# Patient Record
Sex: MALE | Race: WHITE | Employment: FULL TIME | URBAN - METROPOLITAN AREA
[De-identification: names, ages, dates, MRNs, and addresses within clinical notes are randomized per-mention and may not be internally consistent; named-entity substitution may affect disease eponyms.]

---

## 2020-05-04 ENCOUNTER — VIRTUAL VISIT (OUTPATIENT)
Dept: PHYSICAL THERAPY | Facility: CLINIC | Age: 55
End: 2020-05-04
Payer: OTHER MISCELLANEOUS

## 2020-05-04 DIAGNOSIS — S86.811D STRAIN OF CALF MUSCLE, RIGHT, SUBSEQUENT ENCOUNTER: ICD-10-CM

## 2020-05-04 PROCEDURE — 97161 PT EVAL LOW COMPLEX 20 MIN: CPT | Mod: 95 | Performed by: PHYSICAL THERAPIST

## 2020-05-04 PROCEDURE — 97110 THERAPEUTIC EXERCISES: CPT | Mod: 95 | Performed by: PHYSICAL THERAPIST

## 2020-05-04 NOTE — LETTER
"[facility]  [address]  [address]  [place] [address]  [phone]    May 4, 2020    Re: Gonzalo Murry   :   1965  MRN:  [id number]   REFERRING PHYSICIAN:   ANDREA MUHAMMAD RICHY    Date of Initial Evaluation: 20  Visits:  Rxs Used: 1  Reason for Referral:  Strain of calf muscle, right, subsequent encounter    EVALUATION SUMMARY    Physical Therapy Virtual Initial Visit      The patient has been notified of following:     \"This virtual visit will be conducted between you and your provider. We have found that certain health care needs can be provided without the need for physical presence.  This service lets us provide the care you need with a virtual visit.\"    Due to external, as well as internal [facility] management of the COVID-19 Virus, Gonzalo Murry was not seen in our clinic.  As a substitution, we implemented a virtual visit to manage this patient's condition utilizing the PTRx virtual visit platform via the patient s existing code.  The provider, Talib Michel, reviewed the patient's chart, PTRx prescription, and spoke with the patient to determine the following telemedicine visit is appropriate and effective for the patient's care.    The following type of visit was completed:   Telephone Visit:  A telephone visit was used in conjunction with the online PTRx exercise platform.         Subjective:  CESAR Murry is a 54 year old male with a right ankle/calf condition.    The condition occurred due to pushing a heavy object in standing position.  The condition occurred at work.  This is a new condition.  Mechanism/History of injury/symptoms:  20 patient was helping to push a scissor-lift up an incline and felt a pop in his right calf with immediate pain.  He was diagnosed with a calf strain and has been off work since then.   He notes it is improving each day.  The pain is located right posterior calf to heel and the quality of pain is " reported as aching.  The degree of pain is reported as 2-5/10. The timing of pain/symptoms is stiffer in AM. Associated symptoms include: loss of motion/stiffness, weakness, bruising  Gonzalo Lovely   :   1965        Symptoms are exacerbated by: going up/down stairs or ladder, long walks.  Symptoms are relieved by: wrapping the calf, ice/elevation.  Since onset symptoms are gradually improving.    Special tests for this condition include: none.  Previous treatments for this condition include: none.    General health as reported by patient is good.  Pertinent medical history includes: none indicated.  Medical allergies include: none indicated.  Other surgeries include: none indicated.  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  Patient is currently not working due to present treatment condition.    Potential barriers to physical therapy: none.  Red flags: none.    Previous level of function: unrestricted walking/standing as well as stairs and ladderss  Current functional restrictions:  Limited walking/standing tolerance, has not attempted ladders, stairs uncomfortable                Objective:  ANKLE:     AROM L AROM R   Dorsiflexion Normal per patient report Limited per patient report   Plantarflexion symmetrical per patient report symmetrical per patient report   Inversion symmetrical per patient report symmetrical per patient report   Eversion symmetrical per patient report symmetrical per patient report     Edema:    General: none per patient report      Palpation: patient reports mild pain to palpation in right mid-substance of calf with palpable knot, some bruising distally    Gait: patient verbalizes he no longer limps when walking around home  Gonzalo Lovely   :   1965            System  Physical Exam  General   ROS    PTRx Content from today's  visit:  Exercise Name: Towel Stretch Gastroc, Sets: 1 - Reps: 3 with 30 second hold each rep - Sessions: 2-3  Exercise Name: Soleus Stretch on Chair, Sets: 1 - Reps: 3 with 30 second hold - Sessions: 2-3  Exercise Name: Seated Toe Raises/Heel Raises, Sets: 1-2 - Reps: up to 30 - Sessions: 1-2, Notes: you can add a light weight on your knee when this is easy  Exercise Name: Toe Raises instructed to start in 5-7 days if no pain with seated calf raises, Sets: 1-2 - Reps: 10-15 initially, goal is to build up to 30 - Sessions: 1-2, Notes: Start this when you can do sitting calf raises without any pain at all.  progression from flat floor to doing this off a step would be okay in 1-2 weeks  Exercise Name: Balance Single Leg Stance Supported and Unsupported, Sets: 3 - Reps: 30 second hold - Sessions: 1-2, Notes: start on flat floor, when this is easy stand on a pillow or folded towel    Assessment/Plan:     Patient is a 54 year old male with right side ankle complaints.    Patient has the following significant findings with corresponding treatment plan.                Diagnosis 1:  Calf strain    Pain -  hot/cold therapy, manual therapy, self management, education and home program  Decreased ROM/flexibility - manual therapy, therapeutic exercise and home program  Decreased strength - therapeutic exercise, therapeutic activities and home program  Decreased proprioception - neuro re-education, therapeutic activities and home program  Decreased function - therapeutic activities and home program    Previous and current functional limitations:  (See Goal Flow Sheet for this information)    Short term and Long term goals: (See Goal Flow Sheet for this information)     Communication ability:  Patient appears to be able to clearly communicate and understand verbal and written communication and follow directions correctly.  Treatment Explanation - The following has been discussed with the patient:   RX ordered/plan of  care  Anticipated outcomes  Possible risks and side effects  This patient would benefit from PT intervention to resume normal activities.   Rehab potential is good.    Frequency:  1 X week, once daily  Duration:  for 6 weeks  Discharge Plan:  Achieve all LTG.  Gonzalo Murry   :   1965        Independent in home treatment program.  Reach maximal therapeutic benefit.    Please refer to the daily flowsheet for treatment today, total treatment time and time spent performing 1:1 timed codes.       Virtual visit contact time    Time of service began: 9:20 AM  Time of service ended: 10:00 AM  Total Time for set up, visit, and documentation: 60 minutes (10 minutes spent attempting to get video connection)    No coverage found.      Procedure Code/s   Therapeutic Exercise (34398): 20 minutes  Evaluation: 20 minutes    I have reviewed the note as documented above.  This accurately captures the substance of my conversation with the patient.  Provider location: DIONICIO Reynoso ([facility]/State)  Patient location: home    ___________________________________________________             Thank you for your referral.    INQUIRIES  Therapist: ALEJANDRA Lepe [facility]  [address]  [address]  [place] [address]  Phone: [phone]  Fax: [phone]

## 2020-05-04 NOTE — Clinical Note
Abdon,  I scheduled a phone follow up for this walker with you on Monday 5/11.  Good walker, coming off a calf strain and is progressing well.  We could not get video going on his tablet or phone.  He has an old latptop he may use to attempt a video visit with you if you have time, but otherwise is okay doing the phone visits.  He should come along pretty well.  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 Let me know if you have any questions or anything.  Thanks.    Abdullahi

## 2020-05-04 NOTE — PROGRESS NOTES
"Physical Therapy Virtual Initial Visit      The patient has been notified of following:     \"This virtual visit will be conducted between you and your provider. We have found that certain health care needs can be provided without the need for physical presence.  This service lets us provide the care you need with a virtual visit.\"    Due to external, as well as internal [facility] management of the COVID-19 Virus, Gonzalo Murry was not seen in our clinic.  As a substitution, we implemented a virtual visit to manage this patient's condition utilizing the PTRx virtual visit platform via the patient s existing code.  The provider, Talib Michel, reviewed the patient's chart, PTRx prescription, and spoke with the patient to determine the following telemedicine visit is appropriate and effective for the patient's care.    The following type of visit was completed:   Telephone Visit:  A telephone visit was used in conjunction with the online PTRx exercise platform.         Subjective:  HPI   Gonzalo Murry is a 54 year old male with a right ankle/calf condition.    The condition occurred due to pushing a heavy object in standing position.  The condition occurred at work.  This is a new condition.    Mechanism/History of injury/symptoms:  4/28/20 patient was helping to push a scissor-lift up an incline and felt a pop in his right calf with immediate pain.  He was diagnosed with a calf strain and has been off work since then.   He notes it is improving each day.  The pain is located right posterior calf to heel and the quality of pain is reported as aching.  The degree of pain is reported as 2-5/10. The timing of pain/symptoms is stiffer in AM. Associated symptoms include: loss of motion/stiffness, weakness, bruising    Symptoms are exacerbated by: going up/down stairs or ladder, long walks.  Symptoms are relieved by: wrapping the calf, ice/elevation.  Since onset symptoms are gradually improving.    Special tests for " this condition include: none.  Previous treatments for this condition include: none.    General health as reported by patient is good.  Pertinent medical history includes: none indicated.  Medical allergies include: none indicated.  Other surgeries include: none indicated.  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  Patient is currently not working due to present treatment condition.    Potential barriers to physical therapy: none.  Red flags: none.    Previous level of function: unrestricted walking/standing as well as stairs and ladderss  Current functional restrictions:  Limited walking/standing tolerance, has not attempted ladders, stairs uncomfortable                Objective:  ANKLE:     AROM L AROM R   Dorsiflexion Normal per patient report Limited per patient report   Plantarflexion symmetrical per patient report symmetrical per patient report   Inversion symmetrical per patient report symmetrical per patient report   Eversion symmetrical per patient report symmetrical per patient report     Edema:    General: none per patient report      Palpation: patient reports mild pain to palpation in right mid-substance of calf with palpable knot, some bruising distally    Gait: patient verbalizes he no longer limps when walking around home            System  Physical Exam  General   ROS    PTRx Content from today's visit:  Exercise Name: Towel Stretch Gastroc, Sets: 1 - Reps: 3 with 30 second hold each rep - Sessions: 2-3  Exercise Name: Soleus Stretch on Chair, Sets: 1 - Reps: 3 with 30 second hold - Sessions: 2-3  Exercise Name: Seated Toe Raises/Heel Raises, Sets: 1-2 - Reps: up to 30 - Sessions: 1-2, Notes: you can add a light weight on your knee when this is easy  Exercise Name: Toe Raises instructed to start in 5-7 days if no pain with seated calf raises, Sets: 1-2 -  Reps: 10-15 initially, goal is to build up to 30 - Sessions: 1-2, Notes: Start this when you can do sitting calf raises without any pain at all.  progression from flat floor to doing this off a step would be okay in 1-2 weeks  Exercise Name: Balance Single Leg Stance Supported and Unsupported, Sets: 3 - Reps: 30 second hold - Sessions: 1-2, Notes: start on flat floor, when this is easy stand on a pillow or folded towel    Assessment/Plan:     Patient is a 54 year old male with right side ankle complaints.    Patient has the following significant findings with corresponding treatment plan.                Diagnosis 1:  Calf strain    Pain -  hot/cold therapy, manual therapy, self management, education and home program  Decreased ROM/flexibility - manual therapy, therapeutic exercise and home program  Decreased strength - therapeutic exercise, therapeutic activities and home program  Decreased proprioception - neuro re-education, therapeutic activities and home program  Decreased function - therapeutic activities and home program    Therapy Evaluation Codes:   1) History comprised of:   Personal factors that impact the plan of care:      None.    Comorbidity factors that impact the plan of care are:      None.     Medications impacting care: None.  2) Examination of Body Systems comprised of:   Body structures and functions that impact the plan of care:      Ankle.   Activity limitations that impact the plan of care are:      Jumping, Lifting, Running, Stairs, Standing, Walking and Working.  3) Clinical presentation characteristics are:   Stable/Uncomplicated.  4) Decision-Making    Low complexity using standardized patient assessment instrument and/or measureable assessment of functional outcome.  Cumulative Therapy Evaluation is: Low complexity.    Previous and current functional limitations:  (See Goal Flow Sheet for this information)    Short term and Long term goals: (See Goal Flow Sheet for this information)      Communication ability:  Patient appears to be able to clearly communicate and understand verbal and written communication and follow directions correctly.  Treatment Explanation - The following has been discussed with the patient:   RX ordered/plan of care  Anticipated outcomes  Possible risks and side effects  This patient would benefit from PT intervention to resume normal activities.   Rehab potential is good.    Frequency:  1 X week, once daily  Duration:  for 6 weeks  Discharge Plan:  Achieve all LTG.  Independent in home treatment program.  Reach maximal therapeutic benefit.    Please refer to the daily flowsheet for treatment today, total treatment time and time spent performing 1:1 timed codes.       Virtual visit contact time    Time of service began: 9:20 AM  Time of service ended: 10:00 AM  Total Time for set up, visit, and documentation: 60 minutes (10 minutes spent attempting to get video connection)    No coverage found.      Procedure Code/s   Therapeutic Exercise (37281): 20 minutes  Evaluation: 20 minutes    I have reviewed the note as documented above.  This accurately captures the substance of my conversation with the patient.  Provider location: DIONICIO Reynoso ([facility]/State)  Patient location: home    ___________________________________________________

## 2020-05-11 ENCOUNTER — VIRTUAL VISIT (OUTPATIENT)
Dept: PHYSICAL THERAPY | Facility: CLINIC | Age: 55
End: 2020-05-11
Payer: OTHER MISCELLANEOUS

## 2020-05-11 DIAGNOSIS — S86.811D STRAIN OF CALF MUSCLE, RIGHT, SUBSEQUENT ENCOUNTER: Primary | ICD-10-CM

## 2020-05-11 PROCEDURE — 97110 THERAPEUTIC EXERCISES: CPT | Mod: 95 | Performed by: PHYSICAL THERAPIST

## 2020-05-11 NOTE — PROGRESS NOTES
"Physical Therapy Virtual Follow Up Visit      The patient has been notified of following:     \"This virtual visit will be conducted between you and your provider. We have found that certain health care needs can be provided without the need for physical presence.  This service lets us provide the care you need with a virtual visit.\"    Due to external, as well as internal [facility] management of the COVID-19 Virus, Gonzalo Murry was not seen in our clinic.  As a substitution, we implemented a virtual visit to manage this patient's condition utilizing the PTRx virtual visit platform via the patient s existing code.  The provider, Abdon Fabian, reviewed the patient's chart, PTRx prescription, and spoke with the patient to determine the following telemedicine visit is appropriate and effective for the patient's care.    The following type of visit was completed:   Telephone Visit:  A telephone visit was used in conjunction with the online PTRx exercise platform.        S: Gonzalo Murry is a 54 year old male. Connected virtually on the PTRx platform to discuss their condition/progress. He reports he has not had any pain over the past two days and feels good in regards to R calf.  Current pain level: 0/10    O: Patient demonstrated, reported normal range, able to perform stairs painfree ascent and descent, able to perform standing exercises and toe raises off a step painfree.     PTRx Content from today's visit:  Exercise Name: Standing Gastroc Stretch, Notes: hold 30sec holds x2 once daily  Exercise Name: Soleus Stretch on Chair, Sets: 1 - Reps: 3 with 30 second hold - Sessions: 2-3  Exercise Name: Toe Raises, Sets: 1-2 - Reps: 10-15 initially, goal is to build up to 30 - Sessions: 1-2, Notes: Start this when you can do sitting calf raises without any pain at all.  progression from flat floor to doing this off a step would be okay in 1-2 weeks  Exercise Name: Balance Single Leg Stance Supported and " Unsupported, Sets: 3 - Reps: 30 second hold - Sessions: 1-2, Notes: start on flat floor, when this is easy stand on a pillow or folded towel    A:   Patient's symptoms are resolving.  Patient is progressing as expected.  Response to therapy has shown an improvement in  pain level and strength      P: Patient will continue with the exercise program assigned on their PTRx code and will add the following measures to manage their pain/condition: added a weight bearing gastroc stretch and removed towel stretch and seated toe raises.    Plan was for him to simulate work activities by attempting ladder negotiation as follows: ladder once, ladder repeatedly, ladder once with tool belt with drill and hammer, ladder repeatedly with tool belt holding drill and hammer, ladder once holding object with tool belt with drill and hammer, if he is able to achieve this work simulation without calf pain or tightness then no further concerns about returning to work.    Current treatment program is being advanced to more complex exercises.      Virtual visit contact time    Time of service began: 9:45 AM  Time of service ended: 10:01 AM  Total Time for set up, visit, and documentation: 10 minutes    [employer]: WORK COMP / Plan:  [employer] / Product Type: *No Product type* /   .  Procedure Code/s   Therapeutic Exercise (84144): 14 minutes    I have reviewed the note as documented above.  This accurately captures the substance of my conversation with the patient.  Provider location: [place] ([facility])  Patient location: [facility]

## 2022-03-02 ENCOUNTER — APPOINTMENT (OUTPATIENT)
Dept: RADIOLOGY | Facility: CLINIC | Age: 57
End: 2022-03-02
Attending: STUDENT IN AN ORGANIZED HEALTH CARE EDUCATION/TRAINING PROGRAM
Payer: COMMERCIAL

## 2022-03-02 ENCOUNTER — HOSPITAL ENCOUNTER (EMERGENCY)
Facility: CLINIC | Age: 57
Discharge: HOME | End: 2022-03-02
Attending: EMERGENCY MEDICINE | Admitting: EMERGENCY MEDICINE
Payer: COMMERCIAL

## 2022-03-02 VITALS
OXYGEN SATURATION: 97 % | TEMPERATURE: 98.6 F | BODY MASS INDEX: 28.17 KG/M2 | HEART RATE: 67 BPM | HEIGHT: 64 IN | SYSTOLIC BLOOD PRESSURE: 128 MMHG | WEIGHT: 165 LBS | RESPIRATION RATE: 15 BRPM | DIASTOLIC BLOOD PRESSURE: 76 MMHG

## 2022-03-02 DIAGNOSIS — R42 LIGHTHEADEDNESS: ICD-10-CM

## 2022-03-02 LAB
ALBUMIN SERPL-MCNC: 4 G/DL (ref 3.5–5)
ALBUMIN UR-MCNC: NEGATIVE MG/DL
ALP SERPL-CCNC: 66 U/L (ref 45–120)
ALT SERPL W P-5'-P-CCNC: 21 U/L (ref 0–45)
ANION GAP SERPL CALCULATED.3IONS-SCNC: 10 MMOL/L (ref 5–18)
APPEARANCE UR: CLEAR
AST SERPL W P-5'-P-CCNC: 19 U/L (ref 0–40)
BASOPHILS # BLD AUTO: 0 10E3/UL (ref 0–0.2)
BASOPHILS NFR BLD AUTO: 1 %
BILIRUB SERPL-MCNC: 0.5 MG/DL (ref 0–1)
BILIRUB UR QL STRIP: NEGATIVE
BUN SERPL-MCNC: 13 MG/DL (ref 8–22)
CALCIUM SERPL-MCNC: 8.7 MG/DL (ref 8.5–10.5)
CHLORIDE BLD-SCNC: 109 MMOL/L (ref 98–107)
CO2 SERPL-SCNC: 21 MMOL/L (ref 22–31)
COLOR UR AUTO: ABNORMAL
CREAT SERPL-MCNC: 0.8 MG/DL (ref 0.7–1.3)
EOSINOPHIL # BLD AUTO: 0.3 10E3/UL (ref 0–0.7)
EOSINOPHIL NFR BLD AUTO: 8 %
ERYTHROCYTE [DISTWIDTH] IN BLOOD BY AUTOMATED COUNT: 12.5 % (ref 10–15)
ETHANOL SERPL-MCNC: 16 MG/DL
GFR SERPL CREATININE-BSD FRML MDRD: >90 ML/MIN/1.73M2
GLUCOSE BLD-MCNC: 94 MG/DL (ref 70–125)
GLUCOSE UR STRIP-MCNC: NEGATIVE MG/DL
HCT VFR BLD AUTO: 42.9 % (ref 40–53)
HGB BLD-MCNC: 14.2 G/DL (ref 13.3–17.7)
HGB UR QL STRIP: NEGATIVE
IMM GRANULOCYTES # BLD: 0 10E3/UL
IMM GRANULOCYTES NFR BLD: 0 %
KETONES UR STRIP-MCNC: NEGATIVE MG/DL
LEUKOCYTE ESTERASE UR QL STRIP: NEGATIVE
LYMPHOCYTES # BLD AUTO: 0.7 10E3/UL (ref 0.8–5.3)
LYMPHOCYTES NFR BLD AUTO: 20 %
MAGNESIUM SERPL-MCNC: 1.8 MG/DL (ref 1.8–2.6)
MCH RBC QN AUTO: 31.1 PG (ref 26.5–33)
MCHC RBC AUTO-ENTMCNC: 33.1 G/DL (ref 31.5–36.5)
MCV RBC AUTO: 94 FL (ref 78–100)
MONOCYTES # BLD AUTO: 0.3 10E3/UL (ref 0–1.3)
MONOCYTES NFR BLD AUTO: 9 %
MUCOUS THREADS #/AREA URNS LPF: PRESENT /LPF
NEUTROPHILS # BLD AUTO: 2.1 10E3/UL (ref 1.6–8.3)
NEUTROPHILS NFR BLD AUTO: 62 %
NITRATE UR QL: NEGATIVE
NRBC # BLD AUTO: 0 10E3/UL
NRBC BLD AUTO-RTO: 0 /100
PH UR STRIP: 5.5 [PH] (ref 5–7)
PLATELET # BLD AUTO: 199 10E3/UL (ref 150–450)
POTASSIUM BLD-SCNC: 3.6 MMOL/L (ref 3.5–5)
PROT SERPL-MCNC: 7.1 G/DL (ref 6–8)
RBC # BLD AUTO: 4.56 10E6/UL (ref 4.4–5.9)
RBC URINE: <1 /HPF
SODIUM SERPL-SCNC: 140 MMOL/L (ref 136–145)
SP GR UR STRIP: 1.02 (ref 1–1.03)
TROPONIN I SERPL-MCNC: <0.01 NG/ML (ref 0–0.29)
TROPONIN I SERPL-MCNC: <0.01 NG/ML (ref 0–0.29)
UROBILINOGEN UR STRIP-MCNC: <2 MG/DL
WBC # BLD AUTO: 3.4 10E3/UL (ref 4–11)
WBC URINE: 1 /HPF

## 2022-03-02 PROCEDURE — 71045 X-RAY EXAM CHEST 1 VIEW: CPT

## 2022-03-02 PROCEDURE — 81003 URINALYSIS AUTO W/O SCOPE: CPT | Performed by: STUDENT IN AN ORGANIZED HEALTH CARE EDUCATION/TRAINING PROGRAM

## 2022-03-02 PROCEDURE — 99285 EMERGENCY DEPT VISIT HI MDM: CPT | Mod: 25

## 2022-03-02 PROCEDURE — 84484 ASSAY OF TROPONIN QUANT: CPT | Performed by: STUDENT IN AN ORGANIZED HEALTH CARE EDUCATION/TRAINING PROGRAM

## 2022-03-02 PROCEDURE — 96361 HYDRATE IV INFUSION ADD-ON: CPT

## 2022-03-02 PROCEDURE — 82077 ASSAY SPEC XCP UR&BREATH IA: CPT | Performed by: STUDENT IN AN ORGANIZED HEALTH CARE EDUCATION/TRAINING PROGRAM

## 2022-03-02 PROCEDURE — 80053 COMPREHEN METABOLIC PANEL: CPT | Performed by: STUDENT IN AN ORGANIZED HEALTH CARE EDUCATION/TRAINING PROGRAM

## 2022-03-02 PROCEDURE — 83735 ASSAY OF MAGNESIUM: CPT | Performed by: STUDENT IN AN ORGANIZED HEALTH CARE EDUCATION/TRAINING PROGRAM

## 2022-03-02 PROCEDURE — 85025 COMPLETE CBC W/AUTO DIFF WBC: CPT | Performed by: STUDENT IN AN ORGANIZED HEALTH CARE EDUCATION/TRAINING PROGRAM

## 2022-03-02 PROCEDURE — 96360 HYDRATION IV INFUSION INIT: CPT

## 2022-03-02 PROCEDURE — 36415 COLL VENOUS BLD VENIPUNCTURE: CPT | Performed by: STUDENT IN AN ORGANIZED HEALTH CARE EDUCATION/TRAINING PROGRAM

## 2022-03-02 PROCEDURE — 258N000003 HC RX IP 258 OP 636: Performed by: STUDENT IN AN ORGANIZED HEALTH CARE EDUCATION/TRAINING PROGRAM

## 2022-03-02 PROCEDURE — 93005 ELECTROCARDIOGRAM TRACING: CPT | Performed by: STUDENT IN AN ORGANIZED HEALTH CARE EDUCATION/TRAINING PROGRAM

## 2022-03-02 RX ORDER — LISINOPRIL 10 MG/1
10 TABLET ORAL DAILY
COMMUNITY
Start: 2021-12-12

## 2022-03-02 RX ORDER — SUMATRIPTAN 6 MG/.5ML
6 INJECTION, SOLUTION SUBCUTANEOUS ONCE
Status: DISCONTINUED | OUTPATIENT
Start: 2022-03-02 | End: 2022-03-02

## 2022-03-02 RX ADMIN — SODIUM CHLORIDE, POTASSIUM CHLORIDE, SODIUM LACTATE AND CALCIUM CHLORIDE 1000 ML: 600; 310; 30; 20 INJECTION, SOLUTION INTRAVENOUS at 06:21

## 2022-03-02 NOTE — ED PROVIDER NOTES
"  Emergency Department Encounter         FINAL IMPRESSION:  Dizziness, EtOH abuse        ED COURSE AND MEDICAL DECISION MAKING     5:43 AM I met with patient for initial interview and exam. PPE includes surgical mask.   7:33 AM I updated patient with lab and imaging results. Patient is asymptomatic.  9:03 AM We discussed the plan for discharge and the patient is agreeable. Reviewed supportive cares, symptomatic treatment, outpatient follow up, and reasons to return to the Emergency Department. Patient to be discharged by ED RN.      ED Course as of 03/02/22 1333   Wed Mar 02, 2022   0553 Patient is a 56-year-old male history of hypertension lisinopril, here from home/driving to work with a complaint of feeling unwell with weakness dizziness and shakiness.  Patient states he was driving to work when he had a slow onset of \"it felt like I was going to get tunnel vision but it did not happen\" as well as bilateral arm weakness, paresthesias, and shakiness.  Patient denies headaches.  Denies losing consciousness.  No chest pain, diaphoresis, abdominal pain, nausea, neck pain, vertigo or leg weakness.  No bowel or bladder incontinence.  States has been doing well this week otherwise.  States has had this happen before when he was \"dehydrated.\"  No difficulty walking.  On arrival to the ER he is mildly hypertensive.  He looks well and nontoxic.  Alert and oriented x4.  States his symptoms that he was experiencing prior have gotten significantly better.  NIH of 0 on arrival.  Heart and lungs normal.  Abdomen benign.  Chart review reveals that patient was seen in 2016 for similar symptoms and had been having the similar symptoms multiple times throughout the last few years.  Patient describes more presyncope rather than imbalance.  No perioral numbness.  Stroke is in the differential however with an NIH of 0 at this time with no dysmetria, we will not call a stroke code.  No neck pain out suggest dissection.  No fever " suggesting infectious source.  States he got up this morning feeling fine as well as going to bed last night feeling fine.  This week he has been feeling good.  Patient states he drinks 4-5 beers a night.  No abdominal pain or chest pain suggesting dissection or aneurysm.  No history of fainting events.  All movements have been normal with no black or blood.  Does not feel anxious.  Plan for labs including cardiopulmonary and metabolic work-up.  With patient feeling better at this time, I do not think an MRI is quite necessary however if he is not feeling well, low threshold for MRI to rule out posterior circulation stroke.     0558 Electrolyte disturbance is a possibility with his drinking including folate B12.  We will draw a magnesium level as well.  Fluids ordered.  We will do 2 troponins today if patient continues to feel well   0559 EKG: Sinus rhythm with a rate of 68, nonspecific ST change in lead II isolated inversion V1 nonspecific change in V5, no STEMI QTC is 418 no old EKGs for comparison.     .[employer] Stroke Scale  Exam Interval: 0-2 hours post onset of symptoms   Score    Level of consciousness: (0)   Alert, keenly responsive    LOC questions: (0)   Answers both questions correctly    LOC commands: (0)   Performs both tasks correctly    Best gaze: (0)   Normal    Visual: (0)   No visual loss    Facial palsy: (0)   Normal symmetrical movements    Motor arm (left): (0)   No drift    Motor arm (right): (0)   No drift    Motor leg (left): (0)   No drift    Motor leg (right): (0)   No drift    Limb ataxia: (0)   Absent    Sensory: (0)   Normal- no sensory loss    Best language: (0)   Normal- no aphasia    Dysarthria: (0)   Normal    Extinction and inattention: (0)   No abnormality        Total Score:  0     -Labs reassuring.  Patient still mildly intoxicated.  Imaging negative.  Patient states he feels 100% better after fluids.  Plan for discharge home.    I suspect patient's  "presentation is complicated by his EtOH abuse.  No signs of life-threatening emergency today.  EKG  At the conclusion of the encounter I discussed the results of all the tests and the disposition. The questions were answered. The patient or family acknowledged understanding and was agreeable with the care plan.      MEDICATIONS GIVEN IN THE EMERGENCY DEPARTMENT:  Medications   lactated ringers BOLUS 1,000 mL (0 mLs Intravenous Stopped 3/2/22 0830)       NEW PRESCRIPTIONS STARTED AT TODAY'S ED VISIT:  Discharge Medication List as of 3/2/2022  9:05 AM          [facility]     Patient information obtained from: Patient    Use of Interpretor: N/A    Gonzalo Murry is a 56 year old male with a pertinent history of HTN on lisinopril who presents to this ED via walk-in for evaluation of dizziness.    Patient presents from home/driving to work with a complaint of feeling unwell with weakness dizziness and shakiness.  Patient states he was driving to work when he had a slow onset of \"it felt like I was going to get tunnel vision but it did not happen\" as well as bilateral arm weakness, paresthesias, and shakiness.  Patient denies headaches.  Denies losing consciousness.  No chest pain, diaphoresis, abdominal pain, nausea, neck pain, vertigo or leg weakness.  No bowel or bladder incontinence.  States has been doing well this week otherwise.  States has had this happen before when he was \"dehydrated.\"  No difficulty walking.    REVIEW OF SYSTEMS:  Review of Systems   Constitutional: Negative for fever, malaise  HEENT: Negative runny nose, sore throat, ear pain, neck pain  Respiratory: Negative for shortness of breath, cough, congestion  Cardiovascular: Negative for chest pain, leg edema  Gastrointestinal: Negative for abdominal distention, abdominal pain, constipation, vomiting, nausea, diarrhea  Genitourinary: Negative for dysuria and hematuria.   Integument: Negative for rash, skin breakdown  Neurological: Negative for headache, loss " "of consciousness. Positive for dizziness, shakiness, bilateral arm weakness, paresthesias.  Musculoskeletal: Negative for joint pain, joint swelling    All other systems reviewed and are negative.    MEDICAL HISTORY     History reviewed. No pertinent past medical history.    History reviewed. No pertinent surgical history.    Social History     Tobacco Use     Smoking status: Never Smoker     Smokeless tobacco: None   Substance Use Topics     Alcohol use: None     Drug use: None       lisinopril (ZESTRIL) 10 MG tablet            PHYSICAL EXAM     /76   Pulse 67   Temp 98.6  F (37  C) (Temporal)   Resp 15   Ht 1.626 m (5' 4\")   Wt 74.8 kg (165 lb)   SpO2 97%   BMI 28.32 kg/m        PHYSICAL EXAM:   General: Patient appears well, nontoxic, comfortable  HEENT: Moist mucous membranes, no tongue swelling.  No head trauma.  No midline neck pain.  Cardiovascular: Normal rate, normal rhythm, no extremity edema.  No appreciable murmur. Mildly hypertensive.  Respiratory: No signs of respiratory distress, lungs are clear to auscultation bilaterally with no wheezes rhonchi or rales.  Abdominal: Soft, nontender, nondistended, no palpable masses, no guarding, no rebound  Musculoskeletal: Full range of motion of joints, no deformities appreciated.  Neurological: Alert and oriented, +5 strength UE/LE, normal finger to nose, , gross sensation intact throughout, CN II-12 intact grossly, no difficulty with ambulation, no slurring of words, no word finding difficulty    Psychological: Normal affect and mood.  Integument: No rashes appreciated            RESULTS       Labs Ordered and Resulted from Time of ED Arrival to Time of ED Departure   COMPREHENSIVE METABOLIC PANEL - Abnormal       Result Value    Sodium 140      Potassium 3.6      Chloride 109 (*)     Carbon Dioxide (CO2) 21 (*)     Anion Gap 10      Urea Nitrogen 13      Creatinine 0.80      Calcium 8.7      Glucose 94      Alkaline Phosphatase 66      AST 19      " ALT 21      Protein Total 7.1      Albumin 4.0      Bilirubin Total 0.5      GFR Estimate >90     ETHYL ALCOHOL LEVEL - Abnormal    Alcohol, Blood 16 (*)    ROUTINE UA WITH MICROSCOPIC REFLEX TO CULTURE - Abnormal    Color Urine Light Yellow      Appearance Urine Clear      Glucose Urine Negative      Bilirubin Urine Negative      Ketones Urine Negative      Specific Gravity Urine 1.020      Blood Urine Negative      pH Urine 5.5      Protein Albumin Urine Negative      Urobilinogen Urine <2.0      Nitrite Urine Negative      Leukocyte Esterase Urine Negative      Mucus Urine Present (*)     RBC Urine <1      WBC Urine 1     CBC WITH PLATELETS AND DIFFERENTIAL - Abnormal    WBC Count 3.4 (*)     RBC Count 4.56      Hemoglobin 14.2      Hematocrit 42.9      MCV 94      MCH 31.1      MCHC 33.1      RDW 12.5      Platelet Count 199      % Neutrophils 62      % Lymphocytes 20      % Monocytes 9      % Eosinophils 8      % Basophils 1      % Immature Granulocytes 0      NRBCs per 100 WBC 0      Absolute Neutrophils 2.1      Absolute Lymphocytes 0.7 (*)     Absolute Monocytes 0.3      Absolute Eosinophils 0.3      Absolute Basophils 0.0      Absolute Immature Granulocytes 0.0      Absolute NRBCs 0.0     TROPONIN I - Normal    Troponin I <0.01     MAGNESIUM - Normal    Magnesium 1.8     TROPONIN I - Normal    Troponin I <0.01         XR Chest Port 1 View   Final Result   IMPRESSION: Negative chest.                        PROCEDURES:  Procedures:  Procedures         I, Virginia Noyola am serving as a scribe to document services personally performed by Jad Gordon DO, based on my observations and the provider's statements to me.  I, Jad Gordon DO, attest that Virginia Noyola is acting in a scribe capacity, has observed my performance of the services and has documented them in accordance with my direction.    Jad Gordon DO  Emergency Medicine  [facility] EMERGENCY ROOM     Jad Gordon DO  03/02/22  [id number]

## 2022-03-02 NOTE — ED TRIAGE NOTES
"Pt arrives with complaints of lightheadedness that started about 0500 while driving. Pt states, \"I just felt like I was going to have tunnel vision but I never did. My arms both got weak, like it was hard to  the steering wheel.\" No weakness noted on neuro assessment. Talking in full sentences. A&Ox4. Moving all extremities.   "

## 2022-03-09 LAB
ATRIAL RATE - MUSE: 68 BPM
DIASTOLIC BLOOD PRESSURE - MUSE: 74 MMHG
INTERPRETATION ECG - MUSE: NORMAL
P AXIS - MUSE: 54 DEGREES
PR INTERVAL - MUSE: 160 MS
QRS DURATION - MUSE: 82 MS
QT - MUSE: 394 MS
QTC - MUSE: 418 MS
R AXIS - MUSE: 17 DEGREES
SYSTOLIC BLOOD PRESSURE - MUSE: 162 MMHG
T AXIS - MUSE: 44 DEGREES
VENTRICULAR RATE- MUSE: 68 BPM